# Patient Record
Sex: FEMALE | Race: WHITE | NOT HISPANIC OR LATINO | URBAN - METROPOLITAN AREA
[De-identification: names, ages, dates, MRNs, and addresses within clinical notes are randomized per-mention and may not be internally consistent; named-entity substitution may affect disease eponyms.]

---

## 2017-01-05 NOTE — PATIENT DISCUSSION
(H25.13) Age-related nuclear cataract, bilateral - Assesment : Examination revealed cataract. OS>OD - Plan : Monitor for changes. Advised patient to call our office with decreased vision or increased symptoms.

## 2017-01-05 NOTE — PATIENT DISCUSSION
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 H/o of retinal tears and surgeries. OD >OS - Plan : Patient advised to check Amsler Grid regularly (once weekly or more) and use nutraceuticals such as AREDS 2 eye vitamins. Wear sunglasses when outdoors and eat green, leafy vegetables to maintain ocular health.  Refer to retina specialist for further evaluation Rtc 1 year Exam/octm

## 2017-06-29 NOTE — PATIENT DISCUSSION
(H40.013) Open angle with borderline findings, low risk, bilateral - Assesment : Examination revealed suspicion for Open Angle Glaucoma. Increased C/D and C/D asymmetry Iop OU within normal range today, Pt on gtts therapy OD with good compliance, iop much improved. Patient fisrt VF performed today, VF OD shows some progression,recommend repeating secondary to patients first VF. - Plan : Monitor for changes. Advised patient to call our office when decreased vision or increased eye pain.  Continue  latanoprost qdaily OD  Rtc 2-3 months Recheck VF OD and Tn chk

## 2017-09-18 NOTE — PATIENT DISCUSSION
(H40.013) Open angle with borderline findings, low risk, bilateral - Assesment : Examination revealed suspicion for Open Angle Glaucoma. Increased C/D and C/D asymmetry. IOP improved with better drop compliance. VF shows Superior arcuate defect OD and mild inferior compression OS - Plan : Monitor for changes. Advised patient to call our office when decreased vision or increased eye pain.  Continue  latanoprost qdaily OD  RV 3 months TN CHECK, May consider SLT at that time

## 2017-12-11 NOTE — PATIENT DISCUSSION
(H40.013) Open angle with borderline findings, low risk, bilateral - Assesment : Examination revealed suspicion for Open Angle Glaucoma. Increased C/D and C/D asymmetry. Iop within normal range OU on gtts compliance OD - Plan : Monitor for changes. Advised patient to call our office when decreased vision or increased eye pain.  Continue  latanoprost qdaily OD  Rtc 6 months Exam/octo

## 2017-12-11 NOTE — PATIENT DISCUSSION
(H25.13) Age-related nuclear cataract, bilateral - Assesment : Examination revealed cataract. Advised patient catarats are causing the decrease in vision also advised amd can be causing it as well.  - Plan : Sx counseling at this time Needs dilation at time of A-scan

## 2018-02-05 NOTE — PATIENT DISCUSSION
(H25.13) Age-related nuclear cataract, bilateral - Assesment : Examination revealed cataract. DO NOT RECOMMEND MFIOL SECONDARY TO SIGNIFICANT ARMD OU. ASTIGMATISM OS. RECOMMEND TORIC IOL OS TO CORRECT THE ASTIGMATISM - Plan : Risks, Benefits and Alternatives were discussed with patient at length for Cataract Surgery. Visual symptoms are consistent with Cataract findings on examination and current refraction no longer provides satisfactory vision. Patient understands and desires surgery. All questions answered. Risks, Benefits and Alternatives discussed at length for IOL placement. Doctor suggests LIMITED FOCUS OD/ TORIC OS.-CONSIDER . Patient desires LIMITED FOCUS OD ,CONSIDER TORIC OS. [facility] [place] need [place] [facility] [place] **ON TAMSULOSIN**  EYE: OD  IOL TYPE: LIMITED FOCUS  POST OPERATIVE TARGET: DISTANCE PLANO TO -0.50 PACKAGE:  NO PACKAGE OD, TORIC OS  CONSIDER OS TO FOLLOW WITH TORIC.   **BRING IN EARLY - ON TAMSULOSIN**

## 2018-02-05 NOTE — PATIENT DISCUSSION
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 - Plan : Monitor for changes. Advised patient to call our office with decreased vision or increased symptoms.  ECC performed

## 2018-02-05 NOTE — PATIENT DISCUSSION
(J49.9993) Nonexudative age-related macular degeneration bilateral inte - Assesment : Examination revealed AMD Dry.-INTERMEDIATE - Plan : Monitor for changes. Advised patient to call our office with decreased vision or increased distortion. Patient advised to check Amsler Grid regularly (once weekly or more) and use nutraceuticals such as AREDS 2 eye vitamins. Wear sunglasses when outdoors and eat green, leafy vegetables to maintain ocular health. Optical coherence tomography performed.

## 2018-06-12 ENCOUNTER — IMPORTED ENCOUNTER (OUTPATIENT)
Dept: URBAN - METROPOLITAN AREA CLINIC 9 | Facility: CLINIC | Age: 63
End: 2018-06-12

## 2018-06-15 ENCOUNTER — IMPORTED ENCOUNTER (OUTPATIENT)
Dept: URBAN - METROPOLITAN AREA CLINIC 9 | Facility: CLINIC | Age: 63
End: 2018-06-15

## 2018-06-21 ENCOUNTER — IMPORTED ENCOUNTER (OUTPATIENT)
Dept: URBAN - METROPOLITAN AREA CLINIC 9 | Facility: CLINIC | Age: 63
End: 2018-06-21

## 2018-07-24 NOTE — PATIENT DISCUSSION
(H40.013) Open angle with borderline findings, low risk, bilateral - Assesment : Examination revealed suspicion for Open Angle Glaucoma. Increased C/D and C/D asymmetry. Discussed with patient option of I-stent at the time of CE. Advised patient theres no guarantee the stent will stop the glaucoma gtts. - Plan : Monitor for changes. Advised patient to call our office when decreased vision or increased eye pain. Continue  latanoprost qdaily OD  RBA's of i-Stent discussed with patient,patients unable to proceed with I-Stent due to lack of insurance coverage.

## 2018-07-24 NOTE — PATIENT DISCUSSION
(H25.13) Age-related nuclear cataract, bilateral - Assesment : Examination revealed cataract. DO NOT RECOMMEND MFIOL SECONDARY TO SIGNIFICANT ARMD OU. ASTIGMATISM OS. Pt does not mind wearing glasses after CE - Plan : Risks, Benefits and Alternatives were discussed with patient at length for Cataract Surgery. Visual symptoms are consistent with Cataract findings on examination and current refraction no longer provides satisfactory vision. Patient understands and desires surgery. All questions answered. Risks, Benefits and Alternatives discussed at length for IOL placement. Doctor suggests LIMITED FOCUS  . Patient desires LIMITED FOCUS . Patient will need to [place] glasses [facility] [place] **ON TAMSULOSIN**  EYE: OD  IOL TYPE: LIMITED FOCUS  POST OPERATIVE TARGET: DISTANCE PLANO TO -0.50 PACKAGE:  NO PACKAGE OD, TORIC OS  CONSIDER OS TO FOLLOW  **BRING IN EARLY - ON TAMSULOSIN**  PATIENT TO SEE SURGERY COUNSELOR TODAY.  ADDENDUM;  PT UNABLE TO PROCEED WITH I STENT DUE TO LACK OF INSURANCE COVERAGE

## 2018-08-08 NOTE — PATIENT DISCUSSION
(Z96.1) Presence of intraocular lens - Assesment : Patient is Pseudophakic. IOP OD SLIGHTLY ELEVATED TODAY  1 GTT TRAVATAN OD @ 8:39 1 GTT COMBIGAN OD @  8:42 - Plan : Signs and symptoms of infection and retinal detachment are outlined in your surgical packet. Do not rub operated eye. Follow drop schedule. If redness, pain, decreased vision, flashes or floaters occur then contact clinic.

## 2018-08-13 NOTE — PATIENT DISCUSSION
(H25.12) Age-related nuclear cataract, left eye - Assesment : Examination revealed cataract. DO NOT RECOMMEND MFIOL SECONDARY TO SIGNIFICANT ARMD OU. ASTIGMATISM OS. Pt does not mind wearing glasses after CE - Plan : Risks, Benefits and Alternatives were discussed with patient at length for Cataract Surgery. Visual symptoms are consistent with Cataract findings on examination and current refraction no longer provides satisfactory vision. Patient understands and desires surgery. All questions answered. Risks, Benefits and Alternatives discussed at length for IOL placement. Doctor suggests LIMITED FOCUS  . Patient desires LIMITED FOCUS . Patient will need to wear glasses [facility]. 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

## 2018-08-15 ENCOUNTER — IMPORTED ENCOUNTER (OUTPATIENT)
Dept: URBAN - METROPOLITAN AREA CLINIC 9 | Facility: CLINIC | Age: 63
End: 2018-08-15

## 2018-08-22 NOTE — PATIENT DISCUSSION
(Z96.1) Presence of intraocular lens - Assesment : Patient is Pseudophakic. - Plan : Signs and symptoms of infection and retinal detachment are outlined in your surgical packet. Do not rub operated eye. Follow drop schedule. If redness, pain, decreased vision, flashes or floaters occur then contact clinic.  1 week refraction/dilation with JC1

## 2018-08-28 NOTE — PATIENT DISCUSSION
(Z96.1) Presence of intraocular lens - Assesment : Patient is Pseudophakic. Doing well. - Plan : Signs and symptoms of infection and retinal detachment are outlined in your surgical packet. Do not rub operated eye. Continue to follow drop schedule. If redness, pain, decreased vision, flashes or floaters occur then contact clinic. RTC for 1 month PO appt with Anitra Ca, sooner if problems, changes, or concerns.

## 2018-09-17 NOTE — PATIENT DISCUSSION
(Z96.1) Presence of intraocular lens - Assesment : Patient is Pseudophakic. - Plan : Signs and symptoms of infection and retinal detachment are outlined in your surgical packet. Do not rub operated eye. Continue to follow drop schedule. If redness, pain, decreased vision, flashes or floaters occur then contact clinic.  RE START PRED BROM OS QD X 7-10 ADDITIONAL DAYS THEN D/C

## 2018-09-17 NOTE — PATIENT DISCUSSION
(H40.013) Open angle with borderline findings, low risk, bilateral - Assesment : Examination revealed suspicion for Open Angle Glaucoma. C/D ASYMMETRY  IOP OU WNL TODAY - Plan : Monitor for changes. Advised patient to call our office when decreased vision or increased eye pain.  CONTINUE LATANOPROST OD QD X 1 WEEK THEN TRIAL D/C  1 MONTH / TENSION CHECK

## 2018-10-12 NOTE — PATIENT DISCUSSION
(H40.013) Open angle with borderline findings, low risk, bilateral - Assesment : Examination revealed suspicion for Open Angle Glaucoma C/D asymmetry . Iop stable since D/C latanoprost - Plan : Monitor for changes. Advised patient to call our office with decreased vision or an increase in symptoms.  Continue off latanoprost  Rtc 3-4 months kan [place]

## 2019-02-01 NOTE — PATIENT DISCUSSION
(U96.939) Other secondary cataract, left eye - Assesment : Significant posterior capsule opacification present. - Plan : Monitor for Changes. Advised patient to call our office with decreased vision or increased symptoms.

## 2019-02-01 NOTE — PATIENT DISCUSSION
(H26.937) Other secondary cataract, right eye - Assesment : Significant posterior capsule opacification present. - Plan : YAG OD  Recommend that patient undergo a YAG Capsulotomy OD (Right Eye).

## 2019-02-01 NOTE — PATIENT DISCUSSION
(Q61.1575) Nonexudative age-related macular degeneration bilateral inte - Assesment : Examination revealed AMD Dry.- INTERMEDIATE - Plan : Monitor for changes. Advised patient to call our office with decreased vision or increased distortion. Patient advised to check Amsler Grid regularly (once weekly or more) and use nutraceuticals such as AREDS 2 eye vitamins. Wear sunglasses when outdoors and eat green, leafy vegetables to maintain ocular health. Optical coherence tomography performed.

## 2019-02-01 NOTE — PATIENT DISCUSSION
(H40.013) Open angle with borderline findings, low risk, bilateral - Assesment : Examination revealed suspicion for Open Angle Glaucoma-SUSPECT  C/D ASYMMETRY  IOP OU WNL OFF GTT TX AT THIS TIME. - Plan : Monitor for changes. Advised patient to call our office with decreased vision or an increase in symptoms.  Milo

## 2019-04-01 NOTE — PATIENT DISCUSSION
(H26.1) Other secondary cataract, right eye - Assesment : s/p Yag Cap. Doing well. - Plan : Monitor for changes. Pt to call with any vision changes, problems, or concerns. Continue following with Dr. Lev Chavez and Dr. Guille Kendall as scheduled (Pt prefers to alternate between retina and here q 6 months. Recent visit with retina). RTC here in 6 months for Exam and OCT ONH, sooner if problems or changes.

## 2019-09-23 NOTE — PATIENT DISCUSSION
(L34.1609) Nonexudative age-related macular degeneration bilateral inte - Assesment : Examination revealed AMD Dry.- INTERMEDIATE - Plan : Monitor for changes. Advised patient to call our office with decreased vision or increased distortion. Patient advised to check Amsler Grid regularly (once weekly or more) and use nutraceuticals such as AREDS 2 eye vitamins. Wear sunglasses when outdoors and eat green, leafy vegetables to maintain ocular health. Optical coherence tomography performed.

## 2019-09-23 NOTE — PATIENT DISCUSSION
(H40.013) Open angle with borderline findings, low risk, bilateral - Assesment : Examination revealed suspicion for Open Angle Glaucoma based on c/d asymmetry IOP stable today OU. H/O glaucoma gtts OD,but Dr Daniel Simmons D/C after CE. Recommend evaluation with Manuel Bee for second opinion to lower IOP OD. - Plan : Monitor for changes. Advised patient to call our office with decreased vision or an increase in symptoms.  RTC 4-6 weeks HVF 24-2 and Tension Check with  or sooner with any changes

## 2019-10-31 NOTE — PATIENT DISCUSSION
IOP is exellent, pachy wnl, HVF stable today, and OCT ONH stable at last visit with minimal cupping so will monitor closely.

## 2020-10-15 ENCOUNTER — IMPORTED ENCOUNTER (OUTPATIENT)
Dept: URBAN - METROPOLITAN AREA CLINIC 9 | Facility: CLINIC | Age: 65
End: 2020-10-15

## 2020-10-23 NOTE — PATIENT DISCUSSION
Continue following with Retian Specialist as scheduled. Detail Level: Simple Additional Notes: Use Triamcinolone cream 0.1% BID to area x 1 week max

## 2020-10-26 ENCOUNTER — IMPORTED ENCOUNTER (OUTPATIENT)
Dept: URBAN - METROPOLITAN AREA CLINIC 9 | Facility: CLINIC | Age: 65
End: 2020-10-26

## 2020-11-05 ENCOUNTER — IMPORTED ENCOUNTER (OUTPATIENT)
Dept: URBAN - METROPOLITAN AREA CLINIC 9 | Facility: CLINIC | Age: 65
End: 2020-11-05

## 2020-11-05 PROBLEM — Z96.1: Noted: 2020-11-05

## 2021-10-16 ASSESSMENT — TONOMETRY
OS_IOP_MMHG: 10
OS_IOP_MMHG: 12
OD_IOP_MMHG: 15
OD_IOP_MMHG: 13
OS_IOP_MMHG: 18
OS_IOP_MMHG: 13
OS_IOP_MMHG: 11
OD_IOP_MMHG: 12
OD_IOP_MMHG: 12
OS_IOP_MMHG: 14
OD_IOP_MMHG: 13

## 2021-10-16 ASSESSMENT — VISUAL ACUITY
OS_CC: 20/50 SN
OD_SC: 20/25 SN
OD_PH: 20/60 SN
OS_SC: 20/30 SN
OD_CC: 20/20 SN
OS_CC: 20/30 SN
OS_SC: 20/30 SN
OD_SC: 20/70 SN
OS_SC: 20/40 SN
OD_SC: 20/20 SN
OS_SC: 20/200 SN
OS_SC: 20/30 -2 SN
OS_CC: 20/20 SN
OD_PH: 20/60 SN
OS_SC: 20/30 SN
OD_SC: 20/40 SN
OD_SC: 20/70 SN
OD_CC: 20/40 SN
OD_CC: 20/20 SN

## 2021-10-16 ASSESSMENT — KERATOMETRY
OD_AXISANGLE2_DEGREES: 68
OS_K2POWER_DIOPTERS: 42
OS_AXISANGLE_DEGREES: 15
OD_AXISANGLE2_DEGREES: 72
OS_AXISANGLE_DEGREES: 180
OS_AXISANGLE2_DEGREES: 90
OD_K2POWER_DIOPTERS: 41.75
OS_K1POWER_DIOPTERS: 41.75
OS_K2POWER_DIOPTERS: 41.75
OD_K1POWER_DIOPTERS: 41.25
OD_AXISANGLE_DEGREES: 158
OD_K1POWER_DIOPTERS: 41.5
OS_K1POWER_DIOPTERS: 41.75
OD_AXISANGLE_DEGREES: 162
OD_K2POWER_DIOPTERS: 41.75
OS_AXISANGLE2_DEGREES: 105

## 2022-01-14 ENCOUNTER — ESTABLISHED PATIENT (OUTPATIENT)
Dept: URBAN - METROPOLITAN AREA CLINIC 14 | Facility: CLINIC | Age: 67
End: 2022-01-14

## 2022-01-14 DIAGNOSIS — Z96.1: ICD-10-CM

## 2022-01-14 PROCEDURE — 92015 DETERMINE REFRACTIVE STATE: CPT

## 2022-01-14 PROCEDURE — 99214 OFFICE O/P EST MOD 30 MIN: CPT

## 2022-01-14 ASSESSMENT — KERATOMETRY
OS_AXISANGLE2_DEGREES: 90
OD_AXISANGLE2_DEGREES: 45
OS_K2POWER_DIOPTERS: 41.75
OS_K1POWER_DIOPTERS: 41.75
OS_AXISANGLE_DEGREES: 0
OD_AXISANGLE_DEGREES: 135
OD_K2POWER_DIOPTERS: 41.50
OD_K1POWER_DIOPTERS: 41.25

## 2022-01-14 ASSESSMENT — TONOMETRY
OS_IOP_MMHG: 10
OD_IOP_MMHG: 11

## 2022-01-14 ASSESSMENT — VISUAL ACUITY
OD_SC: 20/50
OS_BCVA: 20/40
OD_BCVA: 20/25
OS_SC: 20/50

## 2022-02-15 ENCOUNTER — ESTABLISHED PATIENT (OUTPATIENT)
Dept: URBAN - METROPOLITAN AREA CLINIC 14 | Facility: CLINIC | Age: 67
End: 2022-02-15

## 2022-02-15 DIAGNOSIS — H02.831: ICD-10-CM

## 2022-02-15 DIAGNOSIS — H02.834: ICD-10-CM

## 2022-02-15 PROCEDURE — 92285 EXTERNAL OCULAR PHOTOGRAPHY: CPT

## 2022-02-15 PROCEDURE — 99213 OFFICE O/P EST LOW 20 MIN: CPT

## 2022-02-15 ASSESSMENT — KERATOMETRY
OD_K1POWER_DIOPTERS: 41.25
OS_K2POWER_DIOPTERS: 41.75
OD_K2POWER_DIOPTERS: 41.50
OD_AXISANGLE_DEGREES: 135
OD_AXISANGLE2_DEGREES: 45
OS_AXISANGLE_DEGREES: 0
OS_AXISANGLE2_DEGREES: 90
OS_K1POWER_DIOPTERS: 41.75

## 2022-02-15 ASSESSMENT — VISUAL ACUITY
OS_SC: 20/50
OD_SC: 20/50

## 2022-06-22 NOTE — PATIENT DISCUSSION
(H25.13) Age-related nuclear cataract, bilateral - Assesment : Examination revealed cataract. Advised patient catarats are causing the decrease in vision also advised amd can be causing it as well. - Plan : Monitor for changes. Advised patient to call our office with decreased vision or increased symptoms.
(H40.013) Open angle with borderline findings, low risk, bilateral - Assesment : Examination revealed suspicion for Open Angle Glaucoma. Increased C/D and C/D asymmetry IOP slightly elevated today OD in our office and  office. IOP stable OS. Advised patient progression seen in oct today OD. Discussed Risk of glaucoma and high IOP with patient. Discussed importance of lowering iop with gtts or with laser. - Plan : Monitor for changes. Advised patient to call our office when decreased vision or increased eye pain.  Start latanoprost qdaily OD  Rtc 3 weeks TN chk and VF 24-2
Female

## 2022-11-28 ENCOUNTER — ESTABLISHED PATIENT (OUTPATIENT)
Dept: URBAN - METROPOLITAN AREA CLINIC 4 | Facility: CLINIC | Age: 67
End: 2022-11-28

## 2022-11-28 DIAGNOSIS — S05.02XA: ICD-10-CM

## 2022-11-28 PROCEDURE — 92012 INTRM OPH EXAM EST PATIENT: CPT

## 2022-11-28 RX ORDER — OFLOXACIN 3 MG/ML: 1 SOLUTION/ DROPS OPHTHALMIC

## 2022-11-28 ASSESSMENT — KERATOMETRY
OD_AXISANGLE_DEGREES: 135
OS_K1POWER_DIOPTERS: 41.75
OS_AXISANGLE_DEGREES: 0
OD_AXISANGLE2_DEGREES: 45
OD_K1POWER_DIOPTERS: 41.25
OS_AXISANGLE2_DEGREES: 90
OS_K2POWER_DIOPTERS: 41.75
OD_K2POWER_DIOPTERS: 41.50

## 2022-11-28 ASSESSMENT — TONOMETRY
OD_IOP_MMHG: 11
OS_IOP_MMHG: 14

## 2022-11-28 ASSESSMENT — VISUAL ACUITY
OD_SC: 20/25
OS_SC: 20/50

## 2024-11-14 ENCOUNTER — COMPREHENSIVE EXAM (OUTPATIENT)
Dept: URBAN - METROPOLITAN AREA CLINIC 14 | Facility: CLINIC | Age: 69
End: 2024-11-14

## 2024-11-14 DIAGNOSIS — H26.493: ICD-10-CM

## 2024-11-14 DIAGNOSIS — H35.372: ICD-10-CM

## 2024-11-14 PROCEDURE — 92014 COMPRE OPH EXAM EST PT 1/>: CPT

## 2024-11-14 PROCEDURE — 92134 CPTRZ OPH DX IMG PST SGM RTA: CPT

## 2025-01-31 ENCOUNTER — COMPREHENSIVE EXAM (OUTPATIENT)
Age: 70
End: 2025-01-31

## 2025-01-31 DIAGNOSIS — H26.491: ICD-10-CM

## 2025-01-31 PROCEDURE — 92012 INTRM OPH EXAM EST PATIENT: CPT

## 2025-02-05 ENCOUNTER — SURGERY/PROCEDURE (OUTPATIENT)
Age: 70
End: 2025-02-05

## 2025-02-05 DIAGNOSIS — H26.491: ICD-10-CM

## 2025-02-05 PROCEDURE — 66821 AFTER CATARACT LASER SURGERY: CPT

## 2025-02-21 ENCOUNTER — CONSULTATION/EVALUATION (OUTPATIENT)
Age: 70
End: 2025-02-21

## 2025-02-21 DIAGNOSIS — H04.123: ICD-10-CM

## 2025-02-21 DIAGNOSIS — H16.143: ICD-10-CM

## 2025-02-21 PROCEDURE — 92285 EXTERNAL OCULAR PHOTOGRAPHY: CPT

## 2025-02-21 PROCEDURE — 68761 CLOSE TEAR DUCT OPENING: CPT | Mod: E2,50,E4

## 2025-02-21 PROCEDURE — 99214 OFFICE O/P EST MOD 30 MIN: CPT | Mod: 25

## 2025-03-03 ENCOUNTER — CONSULTATION/EVALUATION (OUTPATIENT)
Age: 70
End: 2025-03-03

## 2025-03-03 DIAGNOSIS — H02.831: ICD-10-CM

## 2025-03-03 DIAGNOSIS — H02.834: ICD-10-CM

## 2025-03-03 PROCEDURE — 92081 LIMITED VISUAL FIELD XM: CPT

## 2025-03-03 PROCEDURE — 99214 OFFICE O/P EST MOD 30 MIN: CPT

## 2025-03-03 PROCEDURE — 92285 EXTERNAL OCULAR PHOTOGRAPHY: CPT

## 2025-03-10 ENCOUNTER — POST-OP (OUTPATIENT)
Age: 70
End: 2025-03-10

## 2025-03-10 DIAGNOSIS — Z98.890: ICD-10-CM

## 2025-03-10 PROCEDURE — 99024 POSTOP FOLLOW-UP VISIT: CPT

## 2025-03-31 ENCOUNTER — SURGERY/PROCEDURE (OUTPATIENT)
Age: 70
End: 2025-03-31

## 2025-03-31 DIAGNOSIS — H02.834: ICD-10-CM

## 2025-03-31 DIAGNOSIS — H02.831: ICD-10-CM

## 2025-03-31 PROCEDURE — 1582350 UPPER BLEPH PER EYE FUNCTIONAL-BILATERAL: Mod: 50,79

## 2025-04-21 ENCOUNTER — POST-OP (OUTPATIENT)
Age: 70
End: 2025-04-21

## 2025-04-21 DIAGNOSIS — H02.834: ICD-10-CM

## 2025-04-21 DIAGNOSIS — Z98.890: ICD-10-CM

## 2025-04-21 DIAGNOSIS — H02.831: ICD-10-CM

## 2025-04-21 PROCEDURE — 99024 POSTOP FOLLOW-UP VISIT: CPT
